# Patient Record
Sex: FEMALE | Race: WHITE | Employment: STUDENT | URBAN - METROPOLITAN AREA
[De-identification: names, ages, dates, MRNs, and addresses within clinical notes are randomized per-mention and may not be internally consistent; named-entity substitution may affect disease eponyms.]

---
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
## 2023-05-24 RX ORDER — CLINDAMYCIN PHOSPHATE 10 UG/ML
LOTION TOPICAL
COMMUNITY
Start: 2019-08-27 | End: 2023-07-05

## 2023-05-24 RX ORDER — CLOTRIMAZOLE 1 %
CREAM (GRAM) TOPICAL 2 TIMES DAILY
COMMUNITY
Start: 2022-12-29 | End: 2023-07-05

## 2023-06-05 NOTE — DISCHARGE INSTRUCTIONS
Swollen Lymph Nodes in Children: Care Instructions  Your Care Instructions  Lymph nodes are small, bean-shaped glands throughout the body. They help the body fight germs and infections. Many things can cause the lymph nodes to swell. In most cases, swollen lymph nodes are not serious. Sometimes lymph nodes can swell when there is an infection in the area. For example, the lymph nodes in the neck, under the chin, or behind the ears may swell and hurt a little when your child has a cold or sore throat. And an injury or infection in a leg or foot can make the lymph nodes in your child's groin swell. Treatment depends on what caused your child's lymph nodes to swell. In most cases, the lymph nodes return to normal size on their own after the cause is gone. It may take a few weeks before the swelling goes away. If the swollen lymph nodes are caused by an infection, your doctor may prescribe antibiotics. Follow-up care is a key part of your child's treatment and safety. Be sure to make and go to all appointments, and call your doctor if your child is having problems. It's also a good idea to know your child's test results and keep a list of the medicines your child takes. How can you care for your child at home? · If the doctor prescribed antibiotics for your child, give them as directed. Do not stop using them just because he or she feels better. Your child needs to take the full course of antibiotics. · Do not squeeze, drain, or puncture a painful lump. Doing this can irritate or inflame the lump, push any existing infection deeper into your child's skin, or cause severe bleeding. And make sure your child does not squeeze or pick at the lump. · Make sure your child drinks plenty of fluids, enough so that his or her urine is light yellow or clear like water.   · If your child has pain from the swollen lymph nodes, give your child an over-the-counter pain medicine, such as acetaminophen (Tylenol) or ibuprofen (Advil, Motrin). Be safe with medicines. Read and follow all instructions on the label. Do not give aspirin to anyone younger than 20. It has been linked to Reye syndrome, a serious illness. · Do not give your child two or more pain medicines at the same time unless the doctor told you to. Many pain medicines have acetaminophen, which is Tylenol. Too much acetaminophen (Tylenol) can be harmful. When should you call for help? Call your doctor now or seek immediate medical care if:  · Your child has a new or higher fever. · Your child's lymph nodes are getting more painful. Watch closely for changes in your child's health, and be sure to contact your doctor if:  · Your child seems to be getting sicker. · Your child's lymph nodes get bigger. · Your child's lymph nodes do not get smaller or do not return to normal size within 2 weeks. Where can you learn more? Go to http://janeth-christina.info/. Ayanna Hinkle in the search box to learn more about \"Swollen Lymph Nodes in Children: Care Instructions. \"  Current as of: March 3, 2017  Content Version: 11.3  © 7547-1363 [employer]. Care instructions adapted under license by [employer] (which disclaims liability or warranty for this information). If you have questions about a medical condition or this instruction, always ask your healthcare professional. [address] any warranty or liability for your use of this information. Helical Rim Advancement Flap Text: The defect edges were debeveled with a #15 blade scalpel.  Given the location of the defect and the proximity to free margins (helical rim) a double helical rim advancement flap was deemed most appropriate.  Using a sterile surgical marker, the appropriate advancement flaps were drawn incorporating the defect and placing the expected incisions between the helical rim and antihelix where possible.  The area thus outlined was incised through and through with a #15 scalpel blade.  With a skin hook and iris scissors, the flaps were gently and sharply undermined and freed up.

## 2023-07-05 ENCOUNTER — OFFICE VISIT (OUTPATIENT)
Age: 18
End: 2023-07-05

## 2023-07-05 VITALS
HEIGHT: 61 IN | WEIGHT: 113 LBS | HEART RATE: 56 BPM | RESPIRATION RATE: 16 BRPM | DIASTOLIC BLOOD PRESSURE: 78 MMHG | SYSTOLIC BLOOD PRESSURE: 123 MMHG | BODY MASS INDEX: 21.34 KG/M2 | OXYGEN SATURATION: 100 % | TEMPERATURE: 97.2 F

## 2023-07-05 DIAGNOSIS — H93.8X1 EAR FULLNESS, RIGHT: Primary | ICD-10-CM

## 2023-07-05 PROCEDURE — 99212 OFFICE O/P EST SF 10 MIN: CPT | Performed by: INTERNAL MEDICINE

## 2023-07-05 ASSESSMENT — ENCOUNTER SYMPTOMS: RESPIRATORY NEGATIVE: 1

## 2024-01-08 ENCOUNTER — OFFICE VISIT (OUTPATIENT)
Age: 19
End: 2024-01-08

## 2024-01-08 VITALS
SYSTOLIC BLOOD PRESSURE: 131 MMHG | TEMPERATURE: 98.5 F | WEIGHT: 123.3 LBS | RESPIRATION RATE: 20 BRPM | OXYGEN SATURATION: 100 % | DIASTOLIC BLOOD PRESSURE: 70 MMHG | HEART RATE: 63 BPM | BODY MASS INDEX: 23.3 KG/M2

## 2024-01-08 DIAGNOSIS — T78.49XA ALLERGIC REACTION TO COSMETICS: ICD-10-CM

## 2024-01-08 DIAGNOSIS — R20.8 FACIAL BURNING: Primary | ICD-10-CM

## 2024-01-08 RX ORDER — DESONIDE 0.5 MG/G
CREAM TOPICAL
Qty: 15 G | Refills: 0 | Status: SHIPPED | OUTPATIENT
Start: 2024-01-08

## 2024-01-08 RX ORDER — CETIRIZINE HYDROCHLORIDE 10 MG/1
10 TABLET ORAL DAILY
Qty: 30 TABLET | Refills: 0 | Status: SHIPPED | OUTPATIENT
Start: 2024-01-08 | End: 2024-02-07

## 2024-01-08 RX ORDER — DROSPIRENONE AND ETHINYL ESTRADIOL 0.02-3(28)
1 KIT ORAL DAILY
COMMUNITY
Start: 2023-12-02

## 2024-01-08 ASSESSMENT — ENCOUNTER SYMPTOMS: ALLERGIC REACTION: 1

## 2024-01-08 NOTE — PROGRESS NOTES
Lashaun Gannon (:  2005) is a 18 y.o. female,Established patient, here for evaluation of the following chief complaint(s):  Allergic Reaction (C/o possible allergic reaction redness and swelling of the face x 1 night)      ASSESSMENT/PLAN:  Visit Diagnoses and Associated Orders       Facial burning    -  Primary    desonide (DESOWEN) 0.05 % cream [2291]      cetirizine (ZYRTEC) 10 MG tablet [9506]           Allergic reaction to cosmetics        desonide (DESOWEN) 0.05 % cream [2291]      cetirizine (ZYRTEC) 10 MG tablet [9506]           ORDERS WITHOUT AN ASSOCIATED DIAGNOSIS    VESTURA 3-0.02 MG per tablet [[id number]]               Per orders      Follow up with PCP in PRN days if symptoms persist or if symptoms worsen.    SUBJECTIVE/OBJECTIVE:    Allergic Reaction     18 y.o. female presents with symptoms of facial burning/irritation, dryness, slight itching, and redness after using new skin care product.  Symptoms started one day after using salicylic acid 2%.  Discarded product after had reaction.  Reports history of sensitive skin.  Tried moisturizer with minimal relief.         Vitals:    24 1046   BP: 131/70   Site: Left Upper Arm   Position: Sitting   Cuff Size: Small Adult   Pulse: 63   Resp: 20   Temp: 98.5 °F (36.9 °C)   TempSrc: Oral   SpO2: 100%   Weight: 55.9 kg (123 lb 4.8 oz)       No results found for this visit on 24.     Physical Exam  Constitutional:       General: She is not in acute distress.     Appearance: Normal appearance. She is not ill-appearing or toxic-appearing.   HENT:      Head: Normocephalic and atraumatic.   Skin:     Findings: Rash present.             Comments: Erythematous rash to nose and right neck, wearing foundation to cheeks and forehead so unable to thoroughly evaluate   Neurological:      Mental Status: She is alert.          An electronic signature was used to authenticate this note.    DARIUS Wheatley - QUE

## 2025-08-03 ENCOUNTER — OFFICE VISIT (OUTPATIENT)
Age: 20
End: 2025-08-03

## 2025-08-03 VITALS
DIASTOLIC BLOOD PRESSURE: 70 MMHG | WEIGHT: 125.8 LBS | RESPIRATION RATE: 16 BRPM | HEART RATE: 85 BPM | TEMPERATURE: 98.8 F | BODY MASS INDEX: 23.77 KG/M2 | SYSTOLIC BLOOD PRESSURE: 118 MMHG | OXYGEN SATURATION: 100 %

## 2025-08-03 DIAGNOSIS — K21.9 GASTROESOPHAGEAL REFLUX DISEASE, UNSPECIFIED WHETHER ESOPHAGITIS PRESENT: Primary | ICD-10-CM

## 2025-08-03 DIAGNOSIS — J00 ACUTE RHINITIS: ICD-10-CM

## 2025-08-03 DIAGNOSIS — J30.1 SEASONAL ALLERGIC RHINITIS DUE TO POLLEN: ICD-10-CM

## 2025-08-03 DIAGNOSIS — J02.9 SORE THROAT: ICD-10-CM

## 2025-08-03 LAB — S PYO AG THROAT QL: NORMAL

## 2025-08-03 RX ORDER — FLUTICASONE PROPIONATE 50 MCG
2 SPRAY, SUSPENSION (ML) NASAL DAILY
Qty: 48 G | Refills: 1 | Status: SHIPPED | OUTPATIENT
Start: 2025-08-03

## 2025-08-03 RX ORDER — LORATADINE 10 MG/1
10 TABLET ORAL DAILY
Qty: 30 TABLET | Refills: 0 | Status: SHIPPED | OUTPATIENT
Start: 2025-08-03

## 2025-08-03 RX ORDER — OMEPRAZOLE 40 MG/1
40 CAPSULE, DELAYED RELEASE ORAL
Qty: 90 CAPSULE | Refills: 1 | Status: SHIPPED | OUTPATIENT
Start: 2025-08-03